# Patient Record
Sex: FEMALE | Race: WHITE | HISPANIC OR LATINO | Employment: UNEMPLOYED | URBAN - METROPOLITAN AREA
[De-identification: names, ages, dates, MRNs, and addresses within clinical notes are randomized per-mention and may not be internally consistent; named-entity substitution may affect disease eponyms.]

---

## 2019-07-02 ENCOUNTER — OFFICE VISIT (OUTPATIENT)
Dept: URGENT CARE | Facility: CLINIC | Age: 64
End: 2019-07-02

## 2019-07-02 VITALS
SYSTOLIC BLOOD PRESSURE: 107 MMHG | HEIGHT: 63 IN | HEART RATE: 89 BPM | RESPIRATION RATE: 16 BRPM | TEMPERATURE: 97 F | DIASTOLIC BLOOD PRESSURE: 79 MMHG | OXYGEN SATURATION: 95 % | BODY MASS INDEX: 33.66 KG/M2 | WEIGHT: 190 LBS

## 2019-07-02 DIAGNOSIS — S63.502A LEFT WRIST SPRAIN, INITIAL ENCOUNTER: ICD-10-CM

## 2019-07-02 DIAGNOSIS — S73.102A HIP SPRAIN, LEFT, INITIAL ENCOUNTER: ICD-10-CM
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
## 2019-07-02 DIAGNOSIS — S80.01XA CONTUSION OF RIGHT KNEE, INITIAL ENCOUNTER: ICD-10-CM

## 2019-07-02 PROCEDURE — 73110 XR WRIST COMPLETE 3 VIEWS LEFT: ICD-10-PCS | Mod: TIER,LT,S$GLB, | Performed by: RADIOLOGY

## 2019-07-02 PROCEDURE — 73110 X-RAY EXAM OF WRIST: CPT | Mod: TIER,LT,S$GLB, | Performed by: RADIOLOGY

## 2019-07-02 PROCEDURE — 73562 XR KNEE 3 VIEW RIGHT: ICD-10-PCS | Mod: TIER,RT,S$GLB, | Performed by: RADIOLOGY

## 2019-07-02 PROCEDURE — 73562 X-RAY EXAM OF KNEE 3: CPT | Mod: TIER,RT,S$GLB, | Performed by: RADIOLOGY

## 2019-07-02 PROCEDURE — 99204 OFFICE O/P NEW MOD 45 MIN: CPT | Mod: S$GLB,,, | Performed by: FAMILY MEDICINE

## 2019-07-02 PROCEDURE — 99204 PR OFFICE/OUTPT VISIT, NEW, LEVL IV, 45-59 MIN: ICD-10-PCS | Mod: S$GLB,,, | Performed by: FAMILY MEDICINE

## 2019-07-02 RX ORDER — LISINOPRIL AND HYDROCHLOROTHIAZIDE 20; 25 MG/1; MG/1
TABLET ORAL
Refills: 5 | COMMUNITY
Start: 2019-04-05

## 2019-07-02 RX ORDER — NAPROXEN 500 MG/1
500 TABLET ORAL 2 TIMES DAILY WITH MEALS
Qty: 30 TABLET | Refills: 1 | Status: SHIPPED | OUTPATIENT
Start: 2019-07-02

## 2019-07-02 RX ORDER — ALBUTEROL SULFATE 0.83 MG/ML
SOLUTION RESPIRATORY (INHALATION)
Refills: 3 | COMMUNITY
Start: 2019-04-19

## 2019-07-02 NOTE — PROGRESS NOTES
"Subjective:       Patient ID: Melony Krishnamurthy is a 63 y.o. female.    Vitals:    07/02/19 1841   BP: 107/79   Pulse: 89   Resp: 16   Temp: 97.2 °F (36.2 °C)   SpO2: 95%   Weight: 86.2 kg (190 lb)   Height: 5' 3" (1.6 m)       Chief Complaint: Knee Pain    Pt states she fell at [employer] 3 days ago. Pt landed on both knees and wrists. Pt is complaining of right knee pain.  Pt is here with a friend who is interpreting for her.    Pt slipped and fell. Was completely horizontal post fall. 7/10 pain    Knee Pain    The incident occurred 3 to 5 days ago. Incident location: at [employer]. The injury mechanism was a fall. The pain is present in the right knee and left thigh. The pain is at a severity of 9/10. The pain has been constant since onset. She reports no foreign bodies present. The symptoms are aggravated by movement. She has tried NSAIDs for the symptoms. The treatment provided mild relief.     Review of Systems   Constitution: Negative for chills and fever.   HENT: Negative for sore throat.    Eyes: Negative for blurred vision.   Cardiovascular: Negative for chest pain.   Respiratory: Negative for shortness of breath.    Skin: Negative for rash.   Musculoskeletal: Positive for joint pain and joint swelling. Negative for back pain.   Gastrointestinal: Negative for abdominal pain, diarrhea, nausea and vomiting.   Neurological: Negative for headaches.   Psychiatric/Behavioral: The patient is not nervous/anxious.        Objective:      Physical Exam   Musculoskeletal:        Left wrist: She exhibits decreased range of motion, tenderness, bony tenderness and crepitus. She exhibits no swelling, no effusion, no deformity and no laceration.        Right knee: She exhibits bony tenderness. She exhibits normal range of motion, no swelling and no effusion. Tenderness found. Patellar tendon tenderness noted.        Arms:       Legs:       Feet:    Bruising over left wrist. Tenderness on extreme flexion and " extension.  --  Right knee tenderness to palpation. Pain extends up to mid-humerus. Pain extends down to mid-tibia. A/w decreased sensation in her plantar foot.         X-ray Wrist Complete Left    Result Date: 7/2/2019  EXAMINATION: XR WRIST COMPLETE 3 VIEWS LEFT CLINICAL HISTORY: Fall (on)(from) sidewalk curb, initial encounter TECHNIQUE: PA, lateral, and oblique views of the left wrist were performed. COMPARISON: None FINDINGS: Overall alignment is within normal limits.  Carpus appears intact.  No displaced fracture, dislocation or destructive osseous process.  Mild degenerative change at the 1st CMC joint.  No subcutaneous emphysema or radiodense retained foreign body.     No acute displaced fracture-dislocation identified. Electronically signed by: Vivek Washburn MD Date:    07/02/2019 Time:    19:37    X-ray Knee 3 View Right    Result Date: 7/2/2019  EXAMINATION: XR KNEE 3 VIEW RIGHT CLINICAL HISTORY: Fall (on)(from) sidewalk curb, initial encounter TECHNIQUE: AP, lateral, and Merchant views of the right knee were performed. COMPARISON: None FINDINGS: There is demineralization of the osseous structures.  No cortical step-off is identified.  There is significant joint space narrowing the lateral compartment along with marked osteophytosis.  There is moderate suprapatellar joint effusion.  There are calcifications within the pretibial soft tissues.  There is no evidence of a fracture or dislocation of the right knee.     No evidence of a fracture or dislocation of the right knee. Marked joint space narrowing in the lateral compartment. Moderate suprapatellar joint effusion.  Additional evaluation, as clinically warranted. Electronically signed by: Praful Ochoa MD Date:    07/02/2019 Time:    19:39    Assessment:       1. Fall (on)(from) sidewalk curb, initial encounter    2. Contusion of right knee, initial encounter        Plan:       Patient Instructions   If your symptoms do not improve see a Orthopedic  Surgeon for further evaluation and treatment.     In the meantime take over the counter pain medication, apply warm compress, and ice for comfort care.      Reducing Knee Pain and Swelling    Many treatments can help reduce pain and swelling in your knee. Your healthcare provider or physical therapist may suggest one or more of the following treatments:  · Icing your knee helps reduce swelling. You may be asked to ice your knee once a day or more. Apply ice for about 15 to 20 minutes at a time, with at least 40 minutes between sessions. Always keep a towel between the ice and your skin.   · Keeping your leg raised above your heart helps excess fluid flow out of your knee joint. This reduces swelling.  · Compression means wrapping an elastic bandage or neoprene sleeve snugly around your knees. This keeps fluid from collecting in your knee joint.  · Electrical stimulation, done by a physical therapist or , can help reduce excess fluid in your knee joint.  · Anti-inflammatory medicines may be prescribed by your healthcare provider. You may take pills or receive injections in your knee.  · Isometric (zakia) exercises strengthen the muscles that support your knee joint. They also help reduce excess fluid in your knee.  · Massage helps fluid drain away from your knee.  ·       Melony was seen today for knee pain.    Diagnoses and all orders for this visit:    Fall (on)(from) sidewalk curb, initial encounter  -     X-Ray Knee 3 View Right; Future  -     X-Ray Wrist Complete Left; Future    Contusion of right knee, initial encounter  -     naproxen (NAPROSYN) 500 MG tablet; Take 1 tablet (500 mg total) by mouth 2 (two) times daily with meals. Take with food to avoid upset stomach

## 2019-07-03 NOTE — PATIENT INSTRUCTIONS
If your symptoms do not improve see a Orthopedic Surgeon for further evaluation and treatment.     In the meantime take over the counter pain medication, apply warm compress, and ice for comfort care.      Reducing Knee Pain and Swelling    Many treatments can help reduce pain and swelling in your knee. Your healthcare provider or physical therapist may suggest one or more of the following treatments:  · Icing your knee helps reduce swelling. You may be asked to ice your knee once a day or more. Apply ice for about 15 to 20 minutes at a time, with at least 40 minutes between sessions. Always keep a towel between the ice and your skin.   · Keeping your leg raised above your heart helps excess fluid flow out of your knee joint. This reduces swelling.  · Compression means wrapping an elastic bandage or neoprene sleeve snugly around your knees. This keeps fluid from collecting in your knee joint.  · Electrical stimulation, done by a physical therapist or , can help reduce excess fluid in your knee joint.  · Anti-inflammatory medicines may be prescribed by your healthcare provider. You may take pills or receive injections in your knee.  · Isometric (zakia) exercises strengthen the muscles that support your knee joint. They also help reduce excess fluid in your knee.  · Massage helps fluid drain away from your knee.  ·